# Patient Record
(demographics unavailable — no encounter records)

---

## 2025-05-02 NOTE — PHYSICAL EXAM
[MA] : MA [FreeTextEntry2] : Lacy [Examination Of The Breasts] : a normal appearance [No Masses] : no breast masses were palpable [Normal] : normal [Uterine Adnexae] : absent

## 2025-05-02 NOTE — HISTORY OF PRESENT ILLNESS
[Y] : Positive pregnancy history [___] : #2 ([unfilled]): [Pregnancy History] :  delivery [Previously active] : previously active [Men] : men [TextBox_4] : 66yo  postmenopausal, she denies any vaginal bleeding.  Last year when she came to see me she requested alendronate refills,  so I did refill the medication but also sent her for a bone scan that came back consistent with osteopenia.  I had called her and advised that she could stop the Alendronate however she decided to continue to take the alendronate.  Today we discussed taking a break from it and then in 2 years repeat her DEXA scan.  She agreed to the same. [Mammogramdate] : 5/7/2024 [PapSmeardate] : 5/2/2025 [BoneDensityDate] : 5/7/2024 [PGHxTotal] : 3 [[facility]Full[facility]] : 2 [PGHxAbortions] : 1 [[facility] CampusxLiving] : 2